# Patient Record
Sex: MALE | Race: WHITE | Employment: FULL TIME | URBAN - METROPOLITAN AREA
[De-identification: names, ages, dates, MRNs, and addresses within clinical notes are randomized per-mention and may not be internally consistent; named-entity substitution may affect disease eponyms.]

---

## 2020-08-23 ENCOUNTER — APPOINTMENT (OUTPATIENT)
Dept: GENERAL RADIOLOGY | Age: 45
End: 2020-08-23
Payer: COMMERCIAL

## 2020-08-23 ENCOUNTER — HOSPITAL ENCOUNTER (EMERGENCY)
Age: 45
Discharge: HOME | End: 2020-08-23
Attending: EMERGENCY MEDICINE
Payer: COMMERCIAL

## 2020-08-23 VITALS
SYSTOLIC BLOOD PRESSURE: 133 MMHG | HEART RATE: 69 BPM | HEIGHT: 72 IN | WEIGHT: 197 LBS | DIASTOLIC BLOOD PRESSURE: 95 MMHG | RESPIRATION RATE: 18 BRPM | BODY MASS INDEX: 26.68 KG/M2 | OXYGEN SATURATION: 99 % | TEMPERATURE: 98.1 F

## 2020-08-23 LAB
ABSOLUTE EOS #: 0.09 K/UL (ref 0–0.4)
ABSOLUTE IMMATURE GRANULOCYTE: ABNORMAL K/UL (ref 0–0.3)
ABSOLUTE LYMPH #: 2.41 K/UL (ref 1–4.8)
ABSOLUTE MONO #: 0.47 K/UL (ref 0.1–1.2)
ALBUMIN SERPL-MCNC: 5 G/DL (ref 3.5–5.2)
ALBUMIN/GLOBULIN RATIO: 1.8 (ref 1–2.5)
ALP BLD-CCNC: 75 U/L (ref 40–129)
ALT SERPL-CCNC: 29 U/L (ref 5–41)
ANION GAP SERPL CALCULATED.3IONS-SCNC: 12 MMOL/L (ref 9–17)
AST SERPL-CCNC: 21 U/L
BASOPHILS # BLD: 0 % (ref 0–2)
BASOPHILS ABSOLUTE: 0.02 K/UL (ref 0–0.2)
BILIRUB SERPL-MCNC: 0.98 MG/DL (ref 0.3–1.2)
BILIRUBIN DIRECT: 0.18 MG/DL
BILIRUBIN, INDIRECT: 0.8 MG/DL (ref 0–1)
BUN BLDV-MCNC: 12 MG/DL (ref 6–20)
BUN/CREAT BLD: ABNORMAL (ref 9–20)
CALCIUM SERPL-MCNC: 10.3 MG/DL (ref 8.6–10.4)
CHLORIDE BLD-SCNC: 98 MMOL/L (ref 98–107)
CO2: 26 MMOL/L (ref 20–31)
CREAT SERPL-MCNC: 0.93 MG/DL (ref 0.7–1.2)
D-DIMER QUANTITATIVE: 0.43 MG/L FEU
DIFFERENTIAL TYPE: ABNORMAL
EOSINOPHILS RELATIVE PERCENT: 1 % (ref 1–4)
GFR AFRICAN AMERICAN: >60 ML/MIN
GFR NON-AFRICAN AMERICAN: >60 ML/MIN
GFR SERPL CREATININE-BSD FRML MDRD: ABNORMAL ML/MIN/{1.73_M2}
GFR SERPL CREATININE-BSD FRML MDRD: ABNORMAL ML/MIN/{1.73_M2}
GLOBULIN: NORMAL G/DL (ref 1.5–3.8)
GLUCOSE BLD-MCNC: 114 MG/DL (ref 70–99)
HCT VFR BLD CALC: 44.7 % (ref 41–53)
HEMOGLOBIN: 15.7 G/DL (ref 13.5–17.5)
IMMATURE GRANULOCYTES: ABNORMAL %
LIPASE: 33 U/L (ref 13–60)
LYMPHOCYTES # BLD: 35 % (ref 24–44)
MCH RBC QN AUTO: 32.3 PG (ref 26–34)
MCHC RBC AUTO-ENTMCNC: 35.1 G/DL (ref 31–37)
MCV RBC AUTO: 92 FL (ref 80–100)
MONOCYTES # BLD: 7 % (ref 2–11)
NRBC AUTOMATED: ABNORMAL PER 100 WBC
PDW BLD-RTO: 12.1 % (ref 12.5–15.4)
PLATELET # BLD: 201 K/UL (ref 140–450)
PLATELET ESTIMATE: ABNORMAL
PMV BLD AUTO: 9.7 FL (ref 8–14)
POTASSIUM SERPL-SCNC: 4.9 MMOL/L (ref 3.7–5.3)
RBC # BLD: 4.86 M/UL (ref 4.5–5.9)
RBC # BLD: ABNORMAL 10*6/UL
SEG NEUTROPHILS: 57 % (ref 36–66)
SEGMENTED NEUTROPHILS ABSOLUTE COUNT: 4 K/UL (ref 1.8–7.7)
SODIUM BLD-SCNC: 136 MMOL/L (ref 135–144)
TOTAL PROTEIN: 7.8 G/DL (ref 6.4–8.3)
TROPONIN INTERP: NORMAL
TROPONIN INTERP: NORMAL
TROPONIN T: NORMAL NG/ML
TROPONIN T: NORMAL NG/ML
TROPONIN, HIGH SENSITIVITY: <6 NG/L (ref 0–22)
TROPONIN, HIGH SENSITIVITY: <6 NG/L (ref 0–22)
WBC # BLD: 7 K/UL (ref 3.5–11)
WBC # BLD: ABNORMAL 10*3/UL

## 2020-08-23 PROCEDURE — 84484 ASSAY OF TROPONIN QUANT: CPT

## 2020-08-23 PROCEDURE — 93005 ELECTROCARDIOGRAM TRACING: CPT | Performed by: EMERGENCY MEDICINE

## 2020-08-23 PROCEDURE — 85025 COMPLETE CBC W/AUTO DIFF WBC: CPT

## 2020-08-23 PROCEDURE — 80048 BASIC METABOLIC PNL TOTAL CA: CPT

## 2020-08-23 PROCEDURE — 36415 COLL VENOUS BLD VENIPUNCTURE: CPT

## 2020-08-23 PROCEDURE — 6370000000 HC RX 637 (ALT 250 FOR IP): Performed by: EMERGENCY MEDICINE

## 2020-08-23 PROCEDURE — 83690 ASSAY OF LIPASE: CPT

## 2020-08-23 PROCEDURE — 71045 X-RAY EXAM CHEST 1 VIEW: CPT

## 2020-08-23 PROCEDURE — 85379 FIBRIN DEGRADATION QUANT: CPT

## 2020-08-23 PROCEDURE — 80076 HEPATIC FUNCTION PANEL: CPT

## 2020-08-23 PROCEDURE — 99285 EMERGENCY DEPT VISIT HI MDM: CPT

## 2020-08-23 RX ORDER — MAGNESIUM HYDROXIDE/ALUMINUM HYDROXICE/SIMETHICONE 120; 1200; 1200 MG/30ML; MG/30ML; MG/30ML
30 SUSPENSION ORAL
Status: COMPLETED | OUTPATIENT
Start: 2020-08-23 | End: 2020-08-23

## 2020-08-23 RX ADMIN — ALUMINUM HYDROXIDE, MAGNESIUM HYDROXIDE, AND SIMETHICONE 30 ML: 200; 200; 20 SUSPENSION ORAL at 12:08

## 2020-08-23 ASSESSMENT — PAIN SCALES - GENERAL: PAINLEVEL_OUTOF10: 4

## 2020-08-23 ASSESSMENT — PAIN DESCRIPTION - LOCATION: LOCATION: CHEST

## 2020-08-23 ASSESSMENT — PAIN DESCRIPTION - PAIN TYPE: TYPE: ACUTE PAIN

## 2020-08-23 ASSESSMENT — PAIN DESCRIPTION - DESCRIPTORS: DESCRIPTORS: TIGHTNESS

## 2020-08-23 NOTE — ED PROVIDER NOTES
oral temperature is 98.1 °F (36.7 °C). His blood pressure is 129/86 and his pulse is 63. His respiration is 17 and oxygen saturation is 98%. Physical Exam  Vitals signs and nursing note reviewed. Constitutional:       Appearance: Normal appearance. HENT:      Head: Normocephalic and atraumatic. Eyes:      Conjunctiva/sclera: Conjunctivae normal.   Neck:      Musculoskeletal: Normal range of motion and neck supple. No neck rigidity or muscular tenderness. Cardiovascular:      Rate and Rhythm: Normal rate and regular rhythm. Pulses: Normal pulses. Heart sounds: Normal heart sounds. Pulmonary:      Effort: Pulmonary effort is normal. No respiratory distress. Breath sounds: Normal breath sounds. No stridor. No wheezing, rhonchi or rales. Abdominal:      General: Bowel sounds are normal. There is no distension. Palpations: Abdomen is soft. Tenderness: There is no abdominal tenderness. There is no guarding. Musculoskeletal: Normal range of motion. General: No swelling or tenderness. Lymphadenopathy:      Cervical: No cervical adenopathy. Skin:     General: Skin is warm and dry. Findings: No rash. Neurological:      General: No focal deficit present. Mental Status: He is alert. DIFFERENTIAL DIAGNOSIS/ MDM:     I will get a chest x-ray EKG and labs    DIAGNOSTIC RESULTS     EKG: All EKG's are interpreted by the Emergency Department Physician who either signs or Co-signs this chart in the [address] a cardiologist.      Interpreted by Jorge A Franks MD     Rhythm: normal sinus   Rate: 60  Axis: 52  Ectopy: none  Conduction: normal  ST Segments: no acute change  T Waves: no acute change  Q Waves: none    Clinical Impression: normal sinus rhythm with no acute changes/normal EKG. No acute infarction/ischemia noted.       Interpreted by Jorge A Franks MD     Rhythm: normal sinus   Rate: 64  Axis: 40  Ectopy: none  Conduction: normal  ST Segments: no acute change  T Waves: no acute change  Q Waves: none    Clinical Impression: normal sinus rhythm with no acute changes/normal EKG. No acute infarction/ischemia noted. RADIOLOGY:  Non-plain film images such as CT, Ultrasound and MRI are read by the radiologist. Stefanie Campos radiographic images are visualized and the radiologist interpretations are reviewed as follows:         Interpretation per the Radiologist below, if available at the time of this note:    XR CHEST PORTABLE (Final result)   Result time 08/23/20 12:18:49   Final result by Pramod Harrison MD (08/23/20 12:18:49)                 Impression:     No acute cardiopulmonary process. Narrative:     EXAMINATION:   ONE XRAY VIEW OF THE CHEST     8/23/2020 12:13 PM     COMPARISON:   None. HISTORY:   ORDERING SYSTEM PROVIDED HISTORY: chest pain   TECHNOLOGIST PROVIDED HISTORY:   chest pain   Reason for Exam: Mid sternal chest pain/tightness for 2 days.  Cough with   deep inspiration   Acuity: Unknown   Type of Exam: Unknown   Additional signs and symptoms: Pt unable to remove nipple piercings     FINDINGS:   AP portable view of the chest time stamped at 1210 hours demonstrates   overlying cardiac monitoring electrodes.  Heart size is normal.  No vascular   congestion, focal consolidation, effusion, or pneumothorax is noted.  Osseous   and mediastinal structures are age-appropriate.                    LABS:  Results for orders placed or performed during the hospital encounter of 68/48/79   Basic Metabolic Panel   Result Value Ref Range    Glucose 114 (H) 70 - 99 mg/dL    BUN 12 6 - 20 mg/dL    CREATININE 0.93 0.70 - 1.20 mg/dL    Bun/Cre Ratio NOT REPORTED 9 - 20    Calcium 10.3 8.6 - 10.4 mg/dL    Sodium 136 135 - 144 mmol/L    Potassium 4.9 3.7 - 5.3 mmol/L    Chloride 98 98 - 107 mmol/L    CO2 26 20 - 31 mmol/L    Anion Gap 12 9 - 17 mmol/L    GFR Non-African American >60 >60 mL/min    GFR African American >60 >60 mL/min    GFR Comment          GFR Staging NOT REPORTED    CBC Auto Differential   Result Value Ref Range    WBC 7.0 3.5 - 11.0 k/uL    RBC 4.86 4.5 - 5.9 m/uL    Hemoglobin 15.7 13.5 - 17.5 g/dL    Hematocrit 44.7 41 - 53 %    MCV 92.0 80 - 100 fL    MCH 32.3 26 - 34 pg    MCHC 35.1 31 - 37 g/dL    RDW 12.1 (L) 12.5 - 15.4 %    Platelets 673 233 - 486 k/uL    MPV 9.7 8.0 - 14.0 fL    NRBC Automated NOT REPORTED per 100 WBC    Differential Type NOT REPORTED     Immature Granulocytes NOT REPORTED 0 %    Absolute Immature Granulocyte NOT REPORTED 0.00 - 0.30 k/uL    WBC Morphology NOT REPORTED     RBC Morphology NOT REPORTED     Platelet Estimate NOT REPORTED     Seg Neutrophils 57 36 - 66 %    Lymphocytes 35 24 - 44 %    Monocytes 7 2 - 11 %    Eosinophils % 1 1 - 4 %    Basophils 0 0 - 2 %    Segs Absolute 4.00 1.8 - 7.7 k/uL    Absolute Lymph # 2.41 1.0 - 4.8 k/uL    Absolute Mono # 0.47 0.1 - 1.2 k/uL    Absolute Eos # 0.09 0.0 - 0.4 k/uL    Basophils Absolute 0.02 0.0 - 0.2 k/uL   Troponin   Result Value Ref Range    Troponin, High Sensitivity <6 0 - 22 ng/L    Troponin T NOT REPORTED <0.03 ng/mL    Troponin Interp NOT REPORTED    Lipase   Result Value Ref Range    Lipase 33 13 - 60 U/L   D-Dimer, Quantitative   Result Value Ref Range    D-Dimer, Quant 0.43 mg/L FEU   Hepatic Function Panel   Result Value Ref Range    Alb 5.0 3.5 - 5.2 g/dL    Alkaline Phosphatase 75 40 - 129 U/L    ALT 29 5 - 41 U/L    AST 21 <40 U/L    Total Bilirubin 0.98 0.3 - 1.2 mg/dL    Bilirubin, Direct 0.18 <0.31 mg/dL    Bilirubin, Indirect 0.80 0.00 - 1.00 mg/dL    Total Protein 7.8 6.4 - 8.3 g/dL    Globulin NOT REPORTED 1.5 - 3.8 g/dL    Albumin/Globulin Ratio 1.8 1.0 - 2.5   Troponin   Result Value Ref Range    Troponin, High Sensitivity <6 0 - 22 ng/L    Troponin T NOT REPORTED <0.03 ng/mL    Troponin Interp NOT REPORTED    EKG 12 Lead   Result Value Ref Range    Ventricular Rate 64 BPM    Atrial Rate 64 BPM    P-R Interval 152 ms    QRS Duration 88 ms    Q-T Interval 398 ms    QTc Calculation (Bazett) 410 ms    P Axis 38 degrees    R Axis 40 degrees    T Axis 39 degrees           EMERGENCY DEPARTMENT COURSE:   Vitals:    Vitals:    08/23/20 1156 08/23/20 1200 08/23/20 1230 08/23/20 1419   BP: (!) 119/93 120/83 124/86 129/86   Pulse: 67 62 62 63   Resp: 16 14 13 17   Temp: 98.1 °F (36.7 °C)      TempSrc: Oral      SpO2: 98% 97% 97% 98%   Weight: 89.4 kg (197 lb)      Height: 6' (1.829 m)        -------------------------  BP: 129/86, Temp: 98.1 °F (36.7 °C), Pulse: 63, Resp: 17      RE-EVALUATION:  The patient's work-up is unremarkable both sets of cardiac enzymes are within normal limits I did offer observation admission which the patient refuses he has had this pain for 2 days he is got 2 sets of negative cardiac enzymes he is not tachycardic he has not hypoxic states he will follow-up with his family doctor calling tomorrow at this time I am recommending that the patient return to the ER for increasing pain shortness of breath abdominal pain fever vomiting or other concerns otherwise to call his family doctor in the morning for a follow-up appointment    At this time the patient is without objective evidence of an acute process requiring hospitalization or inpatient management. They have remained hemodynamically stable throughout their entire ED visit and are stable for discharge with outpatient follow-up. The patient understands that at this time there is no evidence for a more malignant underlying process, but the patient also understands that early in the process of an illness or injury, an emergency department workup can be falsely reassuring. Routine discharge counseling was given, and the patient understands that worsening, changing or persistent symptoms should prompt an immediate call or follow up with their primary physician or return to the emergency department. The importance of appropriate follow up was also discussed.   I have reviewed the disposition diagnosis with the patient and or their family/guardian. I have answered their questions and given discharge instructions. They voiced understanding of these instructions and did not have any further questions or complaints. PROCEDURES:  None    FINAL IMPRESSION      1. Chest pain, unspecified type          DISPOSITION/PLAN   DISPOSITION        CONDITION ON DISPOSITION:   Stable    PATIENT REFERRED TO:  Yovani Treadwell MD  [address]  [phone]    Call in 1 day        DISCHARGE MEDICATIONS:  New Prescriptions    No medications on file       (Please note that portions of this note were completed with a voicerecognition program.  Efforts were made to edit the dictations but occasionally words are mis-transcribed.)    Cross MD, F.A.C.E.P.   Attending Emergency Medicine Physician       Jose Jackson MD  08/23/20 0220

## 2020-08-24 LAB
EKG ATRIAL RATE: 60 BPM
EKG ATRIAL RATE: 64 BPM
EKG P AXIS: 38 DEGREES
EKG P AXIS: 41 DEGREES
EKG P-R INTERVAL: 134 MS
EKG P-R INTERVAL: 152 MS
EKG Q-T INTERVAL: 388 MS
EKG Q-T INTERVAL: 398 MS
EKG QRS DURATION: 86 MS
EKG QRS DURATION: 88 MS
EKG QTC CALCULATION (BAZETT): 388 MS
EKG QTC CALCULATION (BAZETT): 410 MS
EKG R AXIS: 40 DEGREES
EKG R AXIS: 52 DEGREES
EKG T AXIS: 39 DEGREES
EKG T AXIS: 52 DEGREES
EKG VENTRICULAR RATE: 60 BPM
EKG VENTRICULAR RATE: 64 BPM

## 2020-10-26 ENCOUNTER — HOSPITAL ENCOUNTER (OUTPATIENT)
Dept: PHYSICAL THERAPY | Facility: CLINIC | Age: 45
Setting detail: THERAPIES SERIES
Discharge: HOME | End: 2020-10-26
Payer: COMMERCIAL

## 2020-10-26 PROCEDURE — 97161 PT EVAL LOW COMPLEX 20 MIN: CPT

## 2020-10-26 NOTE — CONSULTS
[] [facility]) - [facility] &  Therapy  [address]  [phone]  F: [phone] [] [address]  [place] [address]   [address]  P: [phone]  F: [phone] [x] [address]  Therapy  [address] [facility]  P: [phone]  F: [phone] [] [address]  [facility]   [address]   [place]: [phone]  F: [phone]      Physical Therapy Evaluation    Date:  10/26/2020  Patient: Radha Carrillo  : 1975  MRN: [id number]  Adilson Hyman MD    Insurance: [facility]  Medical Diagnosis: M25.511 (ICD-10-CM) - Pain in right shoulder     Rehab Codes: M25.511, M25.561  Onset Date: 3/1/20   Next 's appt: 10/27/20    Subjective:   CC: Patient reports he is experiencing significant R knee pain and was supposed to receive PT for both    R Shoulder pain:   Constant R shoulder pain reported as dull aching at rest and worse with movement, reaching, L  side lying, and is inable  to do pushups. He can experience intermitting sharp shooting pain and intermittent numbness in finger tips  Pain location is anterior shoulder toward posterior shoulder. Pain is reported as 1-3/10 at rest and 5-6 with ocasional 34/99 with certain activities. R Knee pain:    Patient reports experiencing intermittent Clicking, popping and catching  with continuous pain posterior,lateral and medial knee. Patient states that he is limited in sitting <2hrs and pain is made worse with  lunges, squats, stairs.  Pain described as dull aching, unable to find position of comfort often wears knee brace  Knee pain rated as 5-6/10 at worst at best 3/10 at best     HPI: (onset date) R shoulder 3/1/20 IZZY unknown reports SLAP tear 13yrs ago did not have surgery     R knee: IZZY 2015 stepped down into snow and hit ground hard     Work Activities: [employer]  Working out: cross fit type training 3 days per week    Symptoms:   [x] Worsening: increased intensity, increased frequency of pain    Sleep: [x] OK        PMHx: [] Unremarkable [] Diabetes [] HTN  [] Pacemaker   [] MI/Heart Problems [] Cancer [] Arthritis [x] Other:T10-L5 fusion              [x] Refer to full medical chart  In EPIC     Tests: [x] X-Ray: [x] MRI: scheduled      Medications: [x] Refer to full medical record [] None [] Other:  Allergies:      [x] Refer to full medical record [] None [] Other:    Function:  Hand Dominance  [] Right  [x] Left      OBSERVATION No Deficit Deficit Not Tested Comments   Forward Head [] [] []    Rounded Shoulders [] [] []    Kyphosis [] [] []    Scap Height/Position [] [x] [] Abducted and R elevated    Winging [] [x] []    SH Rhythm [] [x] [] Excessive motion   INSPECTION/PALPATION       SC/AC Joint [x] [] [] pec minor and major    Supraspinatus [x] [] []    Biceps tendon/groove [] [x] []    Posterior shld [] [x] [] infra spinatus   Subscapularis [] [x] []    NEUROLOGICAL       Cervical ROM/Quadrant [] [] [x]    Reflexes [] [] [x]    Compression/Distraction [] [] [x]    Sensation [] [] [x]      IR shoulders  Upper tap hypertrophy    Objective:     ROM  °A/P END FEEL STRENGTH TESTS (+/-) Left Right Not Tested    Left Right  Left Right RTC   []   Sit Shld Flex      Full Can   []   Sit Shld Abd      Painful Arc   []   Sit Shld IR      Drop Arm  - []   Shoulder Flex  125   4    []   Ext      LABRUM   []   ABD  95    Aiea's   []   ER @  90  55   4+ Speeds   []   IR  5   4+ Biceps Load   []   Supraspinatus     4 Pronated Biceps Load      Infraspinatus            Serratus Ant      IMPINGE      Pectoralis      Hooks  +    Rhomboids 4 3+    Neer  +    Mid Trap 4 3+          Pec minor 4 3    INSTABIL      Elbow Flex. Apprehension      Elbow Ext.       BICEPS            Speeds  -    Wrist flex            Wrist ext      Lift Off  +    Rad. Dev.      Tereza Pock Press  +    Ulnar Dev. PROM R shoulder  Flexion: wnl  Abduction:125 degrees    Glenohumeral Passive Joint Mobility  Anterior Capsule:pain  Posterior Capsule:hypo  Inferior Capsule: wfl    R KNEE   ROM wnl     DF R  Knee extended: -20 degrees  Knee flexed -15 degrees    DF L  Knee extended -2 degrees  Knee flexed 4 degrees    PPI:  medial HS, lateral gastroc, med and lat tibial plateau, Patellar tendon    Flexibility Normal Left tight Right tight   Hip flexor [] [x] [x]   quad [] [] []   HS [] [] []   piriformis [] [x] [x]   ITB [] [] []   gastroc [] [x] [x]   Soleus  [] [x] [x]   Levator scap [] [] [x]   Scalenes [] [] [x]      MMT:  G medius 3+ BRYON  G max -3 BRYON    Knee Special Tests:  Compression -,  Distraction+  Yaya +  Obers +  HOP -  SL stance: 30sec bryon, R foot supination bias, L pronation foot bias  SL squat unstable medial knee dive, trunk sway, knee over toes    Functional Test: UEFI Score: 63/80% functionally impaired     Comments:Patient presents to physical therapy c/o R shoulder and R knee pain that is becoming worse and interfering with activities. Patient demonstrates weak R scapular stabilization with elevation and moderate postural deficits into a kyphotic rounded shoulders and IR humerus. Patient also presents with weakness in RC strength. He demonstrates full R knee ROM but has hip and foot stabilization deficits and hypomobility    Assessment:  Patient would benefit from skilled physical therapy services in order to: reduce pain and improve functional mobility    Problems:    [x] ? Pain:  [x] ? ROM:  [x] ? Strength:  [x] ? Function:  [] Other:      STG: (to be met in 9 treatments)  1. ? Pain: Patient to report 0/10 R shoulder pain at rest 4/10 at worst, and 3/10 R knee pain at worst  2. ? ROM:PAtient to demonstrate 160 degrees R shoulder flexion and abduction, -10 degrees R knee DF knee extended  3. ? Strength:  4. ? Function: Patient to demonstrate  5.  Patient to be independent with home exercise program as demonstrated by performance with correct form without cues. 6. Demonstrate Knowledge of fall prevention  LTG: (to be met in 18 treatments)  1. Patient to report 1/10 R shoulder pain with UE elevation and 1/10 R knee pain with stairs and squatting  2. Patient to demonstrate full R shoulder ROM 0 degrees R knee DF  3. Patient to demonstrate 4/5 mid and lower trap and rhomboid mm  4. Patient to demonstrate 4/5 gluteal medius and max  5. Patient to demonstrate the ability to control knee positioning with SL squats                   Patient goals: To stop the pain    Rehab Potential:  [x] Good  [] Fair  [] Poor   Suggested Professional Referral:  [x] No  [] Yes:  Barriers to Goal Achievement:  [x] No  [] Yes:  Domestic Concerns:  [x] No  [] Yes:    Pt. Education:  [x] Plans/Goals, Risks/Benefits discussed  [] Home exercise program  Method of Education: [] Verbal  [] Demo  [] Written  Comprehension of Education:  [] Verbalizes understanding. [] Demonstrates understanding. [] Needs Review. [] Demonstrates/verbalizes understanding of HEP/Ed previously given.     Treatment Plan:  [x] Therapeutic Exercise   79842  [] Iontophoresis: 4 mg/mL Dexamethasone Sodium Phosphate  mAmin  69098   [] Therapeutic Activity  55965 [x] Vasopneumatic cold with compression  66769    [] Gait Training   27045 [] Ultrasound   25955   [x] Neuromuscular Re-education  05360 [] Electrical Stimulation Unattended  73077   [x] Manual Therapy  30727 [] Electrical Stimulation Attended  56123   [x] Instruction in HEP  [] Lumbar/Cervical Traction  23224   [] Aquatic Therapy   41076 [] Cold/hotpack    [] Massage   04269      [] Dry Needling, 1 or 2 muscles  36863   [] Biofeedback, first 15 minutes   97594  [] Biofeedback, additional 15 minutes   39839 [] Dry Needling, 3 or more muscles  08363     []  Medication allergies reviewed for use of    Dexamethasone Sodium Phosphate 4mg/ml     with iontophoresis treatments. Pt is not allergic. Frequency: 1-2 x/week for 18 visits      Modalities: vasocompression [id number] 34 degrees- PRN  Precautions:T10-L5 fusion   Exercises:  Exercise Reps/ Time Weight/ Level Comments   Pully's   VC's for scapular positioning   SB press      Wall slides   scaption only, 5th finger on wall   Ball on wall   s-s, cw VC's for scapular retraction   Supine rhythmic stabs   Elbow on towel   Mid back series   VC's to eliminate scapular tipping   Side lying ER      Prone lower trap on ball      Prone row w/ flexed elbow   VC's for scapular control   4 pt kneel w/ L UE slider   VC's for scapular retraction               Pt. Edu   posture   Other:Manual pec minor release     Specific Instructions for next treatment: See Above      Evaluation Complexity:  History (Personal factors, comorbidities) [x] 0 [] 1-2 [] 3+   Exam (limitations, restrictions) [x] 1-2 [] 3 [] 4+   Clinical presentation (progression) [x] Stable [] Evolving  [] Unstable   Decision Making [x] Low [] Moderate [] High    [x] Low Complexity [] Moderate Complexity [] High Complexity       Treatment Charges: Mins Units   [x] Evaluation       [x]  Low       []  Moderate       []  High 60 1   []  Modalities     []  Ther Exercise     []  Manual Therapy     []  Ther Activities     []  Aquatics     []  Vasocompression     []  Other       TOTAL TREATMENT TIME:     Time in: 2:30   Time Out: 3:10    Electronically signed by: Chinyere Escoto, PT    Physician Signature:________________________________Date:__________________  By signing above or cosigning this note, I have reviewed this plan of care and certify a need for medically necessary rehabilitation services.      *PLEASE SIGN ABOVE AND FAX BACK ALL PAGES*

## 2020-10-29 ENCOUNTER — HOSPITAL ENCOUNTER (OUTPATIENT)
Dept: PHYSICAL THERAPY | Facility: CLINIC | Age: 45
Setting detail: THERAPIES SERIES
Discharge: HOME | End: 2020-10-29
Payer: COMMERCIAL

## 2020-10-29 PROCEDURE — 97110 THERAPEUTIC EXERCISES: CPT

## 2020-10-29 NOTE — FLOWSHEET NOTE
[x] [address]    Outpatient Rehabilitation &  Therapy  [address] [facility]  P: [phone]  F: [phone]      Physical Therapy Daily Treatment Note    Date:  10/29/2020  Patient Name:  Linda Mendez    :  1975  MRN: [id number]  Gregorio Morton MD                       Insurance: [facility]  Medical Diagnosis: F84.094 (ICD-10-CM) - Pain in right shoulder                          Rehab Codes: M25.511, M25.561  Onset Date: 3/1/20                 Next 's appt: PRN awaiting MRI  Visit# / total visits:      Cancels/No Shows: 0    Subjective:    Pain:  [x] Yes  [] No Location: R shoulder   Pain Rating: (0-10 scale) 0-7/10 depending on movement  Pain altered Tx:  [x] No  [] Yes  Action:  Comments:Pt mentioned that his doctor is ordering a MRI for his shoulder. Objective:  Modalities: vasocompression [id number] 34 degrees- PRN  Precautions:T10-L5 fusion   Exercises:  Exercise Reps/ Time Weight/ Level Comments    Pully's 4m   Flexion/scaption x   SB press          Wall slides  20x   scaption only, 5th finger on wall x   Ball on wall  20x Soccer ball  s-s, cw/ccw, up-down  VC's for scapular retraction x          Prone       Prone Scap retraining       Retractions, depression 20x10s   x   Prone lower trap on ball 20x5s   x   Prone row w/ flexed elbow 20x5s   x                        Supine rhythmic stabs     Elbow on towel    Mid back series 20x Yellow  Extension, scaption, triceps x   Side lying ER 20x   Towel under elbow x              4 pt kneel w/ L UE slider     VC's for scapular retraction                          Pt. Edu     posture x   Other:Manual pec minor release      Specific Instructions for next treatment:   Continue with above.    New script was received to address R knee pain Add: manual gastroc STR, gastroc static and dynamic stretch, clam shells, modified pigeon in prone progressing to bilateral bridge as able      Treatment Charges: Mins Units   []  Modalities [x]  Ther Exercise 43 3   [x]  Manual Therapy 5 n/c   []  Ther Activities     []  Aquatics     []  Vasocompression     []  Other     Total Treatment time 48 3       Assessment: [x] Progressing toward goals. Began tx with pulleys to warm up the shoulders. Educated pt on wall slides to ensure shoulder retraction and that the pt had the proper direction into the scaption plane to work on ROM of the shoulder. Demonstrated to pt proper technique in using the ball on the wall to focus on stabilization of the scapula, and instructed pt to maintain retraction of the shoulder for proper technique. Started SB push downs, with the pt unable to perform the motion properly, they were stopped and the motion was broken down while in prone. Had pt perform shoulder retractions with a hold to work on getting the pt familiar with the feel of shoulder retractions. Then added scapular depression with the retraction to develop mid back strengthening to aide in scapular stability. Cued pt during prone row and lower trap exercises to relax the UT during the motion and not engage it while performing the \"squeeze\". Educated pt on the proper direction and technique for the mid back series exercises and cued throughout exercise to keep from flexing the elbow while working the shoulder flexion and scaption. Finished treatment with manual releases for Pec Major and Pec minor to loosen them. Pt denied need for any vasocompression and said he would ice at home if needed. [] No change. [x] Other:    STG: (to be met in 9 treatments)  1. ? Pain: Patient to report 0/10 R shoulder pain at rest 4/10 at worst, and 3/10 R knee pain at worst  2. ? ROM:PAtient to demonstrate 160 degrees R shoulder flexion and abduction, -10 degrees R knee DF knee extended  3. ? Strength:  4. ? Function: Patient to demonstrate  5. Patient to be independent with home exercise program as demonstrated by performance with correct form without cues.   6. Demonstrate Knowledge of fall prevention    LTG: (to be met in 18 treatments)  1. Patient to report 1/10 R shoulder pain with UE elevation and 1/10 R knee pain with stairs and squatting  2. Patient to demonstrate full R shoulder ROM 0 degrees R knee DF  3. Patient to demonstrate 4/5 mid and lower trap and rhomboid mm  4. Patient to demonstrate 4/5 gluteal medius and max  5. Patient to demonstrate the ability to control knee positioning with SL squats                    Patient goals: To stop the pain      Pt. Education:  [x] Yes  [] No  [x] Reviewed Prior HEP/Ed  Method of Education: [x] Verbal  [x] Demo  [] Written  Comprehension of Education:  [x] Verbalizes understanding. [x] Demonstrates understanding. [] Needs review. [] Demonstrates/verbalizes HEP/Ed previously given. Plan: [x] Continue with current plan of care towards goals.         Time In:3:04pm            Time Out: 3:56pm    Electronically signed by:  Srinivas Cuevas PTA

## 2020-11-04 ENCOUNTER — HOSPITAL ENCOUNTER (OUTPATIENT)
Dept: PHYSICAL THERAPY | Facility: CLINIC | Age: 45
Setting detail: THERAPIES SERIES
Discharge: HOME | End: 2020-11-04
Payer: COMMERCIAL

## 2020-11-04 PROCEDURE — 97110 THERAPEUTIC EXERCISES: CPT

## 2020-11-04 NOTE — FLOWSHEET NOTE
[x] [address]    Outpatient Rehabilitation &  Therapy  [address] [facility]  P: [phone]  F: [phone]      Physical Therapy Daily Treatment Note    Date:  2020  Patient Name:  Cyn Flores    :  1975  MRN: [id number]  Mike Clifton MD                       Insurance: [facility]  Medical Diagnosis: H30.583 (ICD-10-CM) - Pain in right shoulder                          Rehab Codes: M25.511, M25.561  Onset Date: 3/1/20                 Next 's appt: PRN awaiting MRI  Visit# / total visits: 3/18     Cancels/No Shows: 0    Subjective:    Pain:  [x] Yes  [] No Location: R shoulder   Pain Rating: (0-10 scale) 0-7/10 depending on movement  Pain altered Tx:  [x] No  [] Yes  Action:  Comments:Patient reports MRI scheduled for     Objective:  Modalities: vasocompression [id number] 34 degrees- PRN  Precautions:T10-L5 fusion   Exercises:  Exercise Reps/ Time Weight/ Level Comments    Pully's 5m   scaption VC's for scapular control x   SB press          Wall slides  20x   scaption only, 5th finger on wall x   Ball on wall  20x  s-s, cw   VC's for scapular retraction x          Prone       Prone Scap retraining    x   Retractions, depression 20x10s  HO Given x   Prone lower trap on ball 20x5s  RESUME    Prone row w/ flexed elbow 20x5s  RESUME           Supine serratus press x20 1.5 Manual guidance to maintain ER alignment x   Supine alphabet x2  Lower and upper case x   Supine rhythmic stabs     Elbow on towel--RESUME    Mid back series 20x Yellow  Extension, scaption, triceps x   Side lying ER 20x   Towel under elbow x              4 pt kneel w/ L UE slider     VC's for scapular retraction--RESUME    4 pt kneel serratus press      RESUME           R KNEE       Static gastroc stretch 3x60\"  HO Given x   Dynamic gastroc stretch 3x60  HO Given x   Clam shells 30/90 x20ea  HO Given x   Hip lift x20  Ball btw knees VC's for gluteal strength and to eliminate rolling x    modified pigeon  x20     VC's for sequencing and to maintain gluteal squeeze x   Pt. Edu     posture x   Other:      Specific Instructions for next treatment:   Continue with above and progress CKC scapular stabilization, review HEP given for hips      Treatment Charges: Mins Units   []  Modalities     [x]  Ther Exercise 55 4   []  Manual Therapy     []  Ther Activities     []  Aquatics     []  Vasocompression     []  Other     Total Treatment time 55 4       Assessment: [x] Progressing toward goals. [] No change. [x] Other: Received a new script to begin therapy to treat R knee pain. Exercises added and education given on the importance of hip stabilization and gastroc length. Tactile and VC's needed to correct alignment and improve exercise technique. Difficulty maintaining pelvic stabilization while completing LE movements. Muscles fatigued quickly. Multi tactile and VC's needed to reduce scapular winging and shrugging. STG: (to be met in 9 treatments)  1. ? Pain: Patient to report 0/10 R shoulder pain at rest 4/10 at worst, and 3/10 R knee pain at worst  2. ? ROM:PAtient to demonstrate 160 degrees R shoulder flexion and abduction, -10 degrees R knee DF knee extended  3. ? Strength:  4. ? Function: Patient to demonstrate  5. Patient to be independent with home exercise program as demonstrated by performance with correct form without cues. 6. Demonstrate Knowledge of fall prevention    LTG: (to be met in 18 treatments)  1. Patient to report 1/10 R shoulder pain with UE elevation and 1/10 R knee pain with stairs and squatting  2. Patient to demonstrate full R shoulder ROM 0 degrees R knee DF  3. Patient to demonstrate 4/5 mid and lower trap and rhomboid mm  4. Patient to demonstrate 4/5 gluteal medius and max  5. Patient to demonstrate the ability to control knee positioning with SL squats                    Patient goals: To stop the pain      Pt.  Education:  [x] Yes  [] No  [x] Reviewed Prior HEP/Ed  Method

## 2020-11-06 ENCOUNTER — HOSPITAL ENCOUNTER (OUTPATIENT)
Dept: PHYSICAL THERAPY | Facility: CLINIC | Age: 45
Setting detail: THERAPIES SERIES
Discharge: HOME | End: 2020-11-06
Payer: COMMERCIAL

## 2020-11-06 PROCEDURE — 97110 THERAPEUTIC EXERCISES: CPT

## 2020-11-06 NOTE — FLOWSHEET NOTE
[x] [address]    Outpatient Rehabilitation &  Therapy  [address]  P: [phone]  F: [phone]      Physical Therapy Daily Treatment Note    Date:  2020  Patient Name:  Sadiq Daniel    :  1975  MRN: [id number]  Eugenio Payton MD                       Insurance: [facility]  Medical Diagnosis: H58.507 (ICD-10-CM) - Pain in right shoulder                          Rehab Codes: M25.511, M25.561  Onset Date: 3/1/20                 Next 's appt: PRN awaiting MRI  Visit# / total visits:      Cancels/No Shows: 0    Subjective:    Pain:  [x] Yes  [] No Location: R shoulder   Pain Rating: (0-10 scale) 0-7/10 depending on movement  Pain altered Tx:  [x] No  [] Yes  Action:  Comments:Pt mentioned that he did a couple hundred push ups the other day. Stated when his shoulder bothered him he just adjusted his arm position and kept going. Objective:  Modalities: vasocompression [id number] 34 degrees- PRN  Precautions:T10-L5 fusion   Exercises:  Exercise Reps/ Time Weight/ Level Comments    Pully's 5m Flexion/scaption scaption VC's for scapular control xx           Wall slides  20x   scaption only, 5th finger on wall xx   Ball on wall  20x 1.5# s-s, cw   VC's for scapular retraction xx          Prone       Prone lower trap on ball 20x5s  Soccer ball  xx   Prone row w/ flexed elbow 20x5s 5#   xx          Supine serratus press x20 2# Manual guidance to maintain ER alignment xx   Supine rhythmic stabs 3x30s     xx   Mid back series 20x Yellow  Extension, scaption, triceps    Side lying ER 20x 2# Towel under elbow xx              4 pt kneel w/ R UE towel    VC's for scapular retraction--  xx   4 pt kneel serratus press 20x     xx          R KNEE       Hip lift x20  Ball btw knees VC's for gluteal strength and to eliminate rolling xx    modified pigeon  x20     VC's for sequencing and to maintain gluteal squeeze xx   Pt.  Edu     posture xx   Other:      Specific Instructions for next treatment:   Continue with above and progress CKC scapular stabilization, review HEP given for hips      Treatment Charges: Mins Units   []  Modalities     [x]  Ther Exercise 55 4   [x]  Manual Therapy 5 0   []  Ther Activities     []  Aquatics     []  Vasocompression     []  Other     Total Treatment time 60 4       Assessment: [x] Progressing toward goals. [] No change. [x] Other: Hands on tactile cueing with most exercises to help ensure proper movement of scapular rhythm. Pt able to perform exercise with good form after corrections. Palpated a larger size trigger point in pt upper trap. Utilized the hypervolt at the end of the treatment to aide in trigger point release. Educated pt on proper posture and scapular stability exercises at home. STG: (to be met in 9 treatments)  1. ? Pain: Patient to report 0/10 R shoulder pain at rest 4/10 at worst, and 3/10 R knee pain at worst  2. ? ROM:PAtient to demonstrate 160 degrees R shoulder flexion and abduction, -10 degrees R knee DF knee extended  3. ? Strength:  4. ? Function: Patient to demonstrate  5. Patient to be independent with home exercise program as demonstrated by performance with correct form without cues. 6. Demonstrate Knowledge of fall prevention    LTG: (to be met in 18 treatments)  1. Patient to report 1/10 R shoulder pain with UE elevation and 1/10 R knee pain with stairs and squatting  2. Patient to demonstrate full R shoulder ROM 0 degrees R knee DF  3. Patient to demonstrate 4/5 mid and lower trap and rhomboid mm  4. Patient to demonstrate 4/5 gluteal medius and max  5. Patient to demonstrate the ability to control knee positioning with SL squats                    Patient goals: To stop the pain      Pt. Education:  [x] Yes  [] No  [x] Reviewed Prior HEP/Ed  Method of Education: [x] Verbal  [x] Demo  [] Written  Comprehension of Education:  [x] Verbalizes understanding. [x] Demonstrates understanding. [] Needs review.   [] Demonstrates/verbalizes HEP/Ed previously given. Plan: [x] Continue with current plan of care towards goals.         Time In:1:55pm            Time Out: 3:00pm    Electronically signed by:  Yuli Stephen PTA

## 2020-11-10 ENCOUNTER — HOSPITAL ENCOUNTER (OUTPATIENT)
Dept: PHYSICAL THERAPY | Facility: CLINIC | Age: 45
Setting detail: THERAPIES SERIES
Discharge: HOME | End: 2020-11-10
Payer: COMMERCIAL

## 2020-11-10 PROCEDURE — 97110 THERAPEUTIC EXERCISES: CPT

## 2020-11-10 NOTE — FLOWSHEET NOTE
[x] [address]    Outpatient Rehabilitation &  Therapy  [address] [facility]  P: [phone]  F: [phone]      Physical Therapy Daily Treatment Note    Date:  11/10/2020  Patient Name:  Reggie Pacheco    :  1975  MRN: [id number]  Vel Moreau MD                       Insurance: [facility]  Medical Diagnosis: A98.867 (ICD-10-CM) - Pain in right shoulder                          Rehab Codes: M25.511, M25.561  Onset Date: 3/1/20                 Next 's appt: PRN awaiting MRI  Visit# / total visits:      Cancels/No Shows: 0    Subjective:    Pain:  [x] Yes  [] No Location: R shoulder   Pain Rating: (0-10 scale) 0-7/10 depending on movement  Pain altered Tx:  [x] No  [] Yes  Action:  Comments:Pt says he is feeling pretty good today. Mentioned a little bit of pain in the right shoulder but otherwise he's feeling fine. Objective:  Modalities: vasocompression [id number] 34 degrees- PRN  Precautions:T10-L5 fusion   Exercises:  Exercise Reps/ Time Weight/ Level Comments    Pully's 5m Flexion/scaption scaption VC's for scapular control x           Wall slides  20x   scaption only, 5th finger on wall x   Ball on wall  20x 1.5# s-s, cw   VC's for scapular retraction x          Prone       Prone lower trap on ball 20x5s  Soccer ball  x   Prone row w/ flexed elbow 20x5s 5#   x          Supine serratus press x20 2#  x   Supine rhythmic stabs 3x30s     x   Mid back series 20x blue Extension, scaption, triceps    Side lying ER 20x 2# Towel under elbow x              4 pt kneel w/ R UE towel    VC's for scapular retraction--  x   4 pt kneel serratus press 20x     x          Tband 20x blue Extension, triceps, rows x          R KNEE       clamshells x20 blue  x   SL hip abduction x20 blue  x   Bridges x20 blue  x   modified pigeon x20    VC's for sequencing and to maintain gluteal squeeze x   Pt.  Edu     posture    Other:      Specific Instructions for next treatment:   Continue with above and shoulder ROM 0 degrees R knee DF  3. Patient to demonstrate 4/5 mid and lower trap and rhomboid mm  4. Patient to demonstrate 4/5 gluteal medius and max  5. Patient to demonstrate the ability to control knee positioning with SL squats                    Patient goals: To stop the pain      Pt. Education:  [x] Yes  [] No  [x] Reviewed Prior HEP/Ed  Method of Education: [x] Verbal  [x] Demo  [] Written  Comprehension of Education:  [x] Verbalizes understanding. [x] Demonstrates understanding. [] Needs review. [] Demonstrates/verbalizes HEP/Ed previously given. Plan: [x] Continue with current plan of care towards goals. Time In: 2:00pm            Time Out: 3:01pm    Electronically signed by:  Manjit Ji The above documentation completed by John Romero , Student Physical Therapist Assistant, was reviewed and accepted by supervising Clinical Instructor Kathya Barajas PTA.

## 2020-11-13 ENCOUNTER — HOSPITAL ENCOUNTER (OUTPATIENT)
Dept: PHYSICAL THERAPY | Facility: CLINIC | Age: 45
Setting detail: THERAPIES SERIES
Discharge: HOME | End: 2020-11-13
Payer: COMMERCIAL

## 2020-11-13 PROCEDURE — 97140 MANUAL THERAPY 1/> REGIONS: CPT

## 2020-11-13 PROCEDURE — 97110 THERAPEUTIC EXERCISES: CPT

## 2020-11-13 PROCEDURE — 97016 VASOPNEUMATIC DEVICE THERAPY: CPT

## 2020-11-13 NOTE — FLOWSHEET NOTE
[x] [address]    Outpatient Rehabilitation &  Therapy  [address] [facility]  P: [phone]  F: [phone]      Physical Therapy Daily Treatment Note    Date:  2020  Patient Name:  Cyn Flores    :  1975  MRN: [id number]  Mike Clifton MD                       Insurance: [facility]  Medical Diagnosis: K31.808 (ICD-10-CM) - Pain in right shoulder                          Rehab Codes: M25.511, M25.561  Onset Date: 3/1/20                 Next 's appt: PRN awaiting MRI  Visit# / total visits:      Cancels/No Shows: 0    Subjective:    Pain:  [x] Yes  [] No Location: R shoulder   Pain Rating: (0-10 scale) 0-7/10 depending on movement  Pain altered Tx:  [x] No  [] Yes  Action:  Comments:Patient reports feeling good today.  No c/o R knee pain he feels pain primarily with squatting and lunges minimal R shoulder pain    Objective:  Modalities: vasocompression [id number] 34 degrees- PRN  Manual: STR R gastroc with and without hypervolt x8mins  Precautions:T10-L5 fusion   Exercises:  Exercise Reps/ Time Weight/ Level Comments    Pully's 5m scaption only scaption VC's for scapular control x           Wall slides  20x   scaption only, 5th finger on wall x   Ball on wall  20x 1.5# s-s, cw   VC's for scapular retraction x          Prone       Prone lower trap on ball 20x10\"  Soccer ball  x   Prone row w/ flexed elbow 15x5s 5#   x          Supine serratus press x20 2#     Supine rhythmic stabs 3x30s        Mid back series 20x blue Extension, scaption, triceps    Side lying ER 20x 2# Towel under elbow               4 pt kneel w/ R UE  2x10   VC's for scapular retraction--  x   4 pt kneel serratus press 20x     x          Tband 20x blue Extension, triceps, rows           R KNEE       Static gastroc stretch 3x60\"   x   Dynamic gastroc stretch 3x20   x   clamshells x20 blue  x   Zahra hip abd x15   x   Bridges 20x5\" blue  x   modified pigeon x20    VC's for sequencing and to maintain gluteal squeeze    Pt. Edu     Squat mechanics x   Other:       Specific Instructions for next treatment:   Continue with above and progress CKC scapular stabilization, review HEP given for hips    Treatment Charges: Mins Units   []  Modalities     [x]  Ther Exercise 35 2   [x]  Manual Therapy 10 1   []  Ther Activities     []  Aquatics     [x]  Vasocompression 15 1   []  Other     Total Treatment time 60 4       Assessment: [x] Progressing toward goals. Patient reports noncompliance with HEP for R knee rehab. Therapist discussed with patient the importance of hip strengthening and hip and knee ROM to reduce stress to R knee. Reviewed HEP and patient education given on squat mechanics and lunges with proper knee alignment. Multi VC's needed with R shoulder exercises to reduce upper trap hyperactivity and to control scapular stabilization. [] No change. STG: (to be met in 9 treatments)  1. ? Pain: Patient to report 0/10 R shoulder pain at rest 4/10 at worst, and 3/10 R knee pain at worst  2. ? ROM:PAtient to demonstrate 160 degrees R shoulder flexion and abduction, -10 degrees R knee DF knee extended  3. ? Strength:  4. ? Function: Patient to demonstrate  5. Patient to be independent with home exercise program as demonstrated by performance with correct form without cues. 6. Demonstrate Knowledge of fall prevention    LTG: (to be met in 18 treatments)  1. Patient to report 1/10 R shoulder pain with UE elevation and 1/10 R knee pain with stairs and squatting  2. Patient to demonstrate full R shoulder ROM 0 degrees R knee DF  3. Patient to demonstrate 4/5 mid and lower trap and rhomboid mm  4. Patient to demonstrate 4/5 gluteal medius and max  5. Patient to demonstrate the ability to control knee positioning with SL squats                    Patient goals: To stop the pain      Pt.  Education:  [x] Yes  [] No  [x] Reviewed Prior HEP/Ed  Method of Education: [x] Verbal  [x] Demo  [] Written  Comprehension of Education:  [x] Verbalizes understanding. [x] Demonstrates understanding. [] Needs review. [] Demonstrates/verbalizes HEP/Ed previously given. Plan: [x] Continue with current plan of care towards goals.         Time In: 8am            Time Out: 9am  Electronically signed by:  Iván Pinto PT

## 2020-11-17 ENCOUNTER — HOSPITAL ENCOUNTER (OUTPATIENT)
Dept: PHYSICAL THERAPY | Facility: CLINIC | Age: 45
Setting detail: THERAPIES SERIES
Discharge: HOME | End: 2020-11-17
Payer: COMMERCIAL

## 2020-11-17 ENCOUNTER — APPOINTMENT (OUTPATIENT)
Dept: PHYSICAL THERAPY | Facility: CLINIC | Age: 45
End: 2020-11-17
Payer: COMMERCIAL

## 2020-11-17 PROCEDURE — 97016 VASOPNEUMATIC DEVICE THERAPY: CPT

## 2020-11-17 PROCEDURE — 97110 THERAPEUTIC EXERCISES: CPT

## 2020-11-17 NOTE — FLOWSHEET NOTE
[x] [address]   Outpatient Rehabilitation &  Therapy  [address]  P: [phone]  F: [phone]      Physical Therapy Daily Treatment Note    Date:  2020  Patient Name:  Ophelia Sherman    :  1975  MRN: [id number]  James Sebastian MD                       Insurance: [facility]  Medical Diagnosis: U72.492 (ICD-10-CM) - Pain in right shoulder                          Rehab Codes: M25.511, M25.561  Onset Date: 3/1/20                 Next 's appt: PRN awaiting MRI  Visit# / total visits:      Cancels/No Shows: 0    Subjective:    Pain:  [] Yes  [x] No Location: R shoulder   Pain Rating: (0-10 scale) 0/10   Pain altered Tx:  [x] No  [] Yes  Action:  Comments:Patient arrived reporting no pain or issues in shoulder or knee. Pt stated he was a little sore after last visit but went away after a couple of hours.      Objective:  Modalities: vasocompression [id number] 34 degrees- PRN  Manual: STR R gastroc with and without hypervolt x5mins  Precautions:T10-L5 fusion   Exercises:  Exercise Reps/ Time Weight/ Level Comments    Pully's 5m scaption only scaption VC's for scapular control x           Wall slides  20x   scaption only, 5th finger on wall x   Ball on wall  20x 1.5# s-s, cw   VC's for scapular retraction   90 and 120 degree flexion, 90 degree abduction x          Prone       Prone lower trap on ball 20x10\"  Soccer ball  x   Prone row w/ flexed elbow 15x5s 5#   x          Supine serratus press x20 2#     Supine rhythmic stabs 3x30s        Mid back series 20x blue Extension, scaption, triceps    Side lying ER 20x 2# Towel under elbow               4 pt kneel w/ R UE  2x10   VC's for scapular retraction--  x   4 pt kneel serratus press 20x     x          Tband 20x blue Extension, triceps, rows           R KNEE       Static gastroc stretch 3x60\"   x   Dynamic gastroc stretch 3x20   x   clamshells 2x15 blue  x   Zahra hip abd 2x15   x   Bridges 20x5\" blue  x   modified pigeon x20    VC's for sequencing and to maintain gluteal squeeze    monsterwalks 2 laps blue Around ankles x   3 way hip 20x blue  x   Heel taps 2x10 4\"  x   Pt. Edu     Squat mechanics x   Other:       Specific Instructions for next treatment:   Continue with above and progress CKC scapular stabilization, review HEP given for hips    Treatment Charges: Mins Units   []  Modalities     [x]  Ther Exercise 30 2   [x]  Manual Therapy 5 0   []  Ther Activities     []  Aquatics     [x]  Vasocompression 15 1   []  Other     Total Treatment time 50 3       Assessment: [x] Progressing toward goals. Progressed pt with a adding in monsterwalks, 3 way hip, and heel taps for increased hip and knee strengthening with good tolerance. Pt needing VC's for slow and controlled motion with exercises with poor carryover. No increase in pain with exercises this date. Ended with vaso for decreased soreness. [] No change. STG: (to be met in 9 treatments)  1. ? Pain: Patient to report 0/10 R shoulder pain at rest 4/10 at worst, and 3/10 R knee pain at worst  2. ? ROM:PAtient to demonstrate 160 degrees R shoulder flexion and abduction, -10 degrees R knee DF knee extended  3. ? Strength:  4. ? Function: Patient to demonstrate  5. Patient to be independent with home exercise program as demonstrated by performance with correct form without cues. 6. Demonstrate Knowledge of fall prevention    LTG: (to be met in 18 treatments)  1. Patient to report 1/10 R shoulder pain with UE elevation and 1/10 R knee pain with stairs and squatting  2. Patient to demonstrate full R shoulder ROM 0 degrees R knee DF  3. Patient to demonstrate 4/5 mid and lower trap and rhomboid mm  4. Patient to demonstrate 4/5 gluteal medius and max  5. Patient to demonstrate the ability to control knee positioning with SL squats                    Patient goals: To stop the pain      Pt.  Education:  [x] Yes  [] No  [x] Reviewed Prior HEP/Ed  Method of Education: [x]

## 2020-11-20 ENCOUNTER — HOSPITAL ENCOUNTER (OUTPATIENT)
Dept: PHYSICAL THERAPY | Facility: CLINIC | Age: 45
Setting detail: THERAPIES SERIES
Discharge: HOME | End: 2020-11-20
Payer: COMMERCIAL

## 2020-11-20 PROCEDURE — 97016 VASOPNEUMATIC DEVICE THERAPY: CPT

## 2020-11-20 PROCEDURE — 97110 THERAPEUTIC EXERCISES: CPT

## 2020-11-20 NOTE — FLOWSHEET NOTE
[x] [address]   Outpatient Rehabilitation &  Therapy  [address]  P: [phone]  F: [phone]     Physical Therapy Daily Treatment Note    Date:  2020  Patient Name:  Linda Mendez    :  1975  MRN: [id number]  Gregorio Morton MD                       Insurance: [facility]  Medical Diagnosis: Z56.705 (ICD-10-CM) - Pain in right shoulder                          Rehab Codes: M25.511, M25.561  Onset Date: 3/1/20                 Next 's appt: PRN awaiting MRI  Visit# / total visits:      Cancels/No Shows: 0    Subjective:  Pt arrives noting increased R shoulder soreness d/t getting his MRI yesterday. R hip and knee are a little sore as well.    Pain:  [] Yes  [x] No Location: R shoulder   Pain Rating: (0-10 scale) 0/10   Pain altered Tx:  [x] No  [] Yes  Action:  Comments:      Objective:  Modalities: vasocompression [id number] 34 degrees- PRN  Manual: STR R gastroc with and without hypervolt x5mins  Precautions:T10-L5 fusion   Exercises:  Exercise Reps/ Time Weight/ Level Comments    Pully's 5m scaption only scaption VC's for scapular control x           Wall slides  20x   scaption only, 5th finger on wall x   Ball on wall  20x 1.5# s-s, cw   VC's for scapular retraction   90 and 120 degree flexion, 90 degree abduction x          Prone       Prone lower trap on ball 20x10\"  Soccer ball     Prone row w/ flexed elbow 15x5s 5#             Supine serratus press x20 2#     Supine rhythmic stabs 3x30s        Mid back series 20x blue Extension, scaption, triceps    Side lying ER 20x 2# Towel under elbow               4 pt kneel w/ R UE  2x10   VC's for scapular retraction--     4 pt kneel serratus press 20x               Tband 20x blue Extension, triceps, rows           R KNEE       Static gastroc stretch 3x60\"   x   Dynamic gastroc stretch 3x20   x   clamshells 2x15 blue  x   Zahra hip abd 2x15 blue  x   Bridges 20x5\" blue  x   Prone hip ext 20x ea blue  x   modified pigeon x20    VC's for sequencing and to maintain gluteal squeeze    monsterwalks 2 laps blue Around ankles x   3 way hip 20x blue  x   Heel taps 2x10 4\"  x   Pt. Edu     Squat mechanics x   Other:       Specific Instructions for next treatment:   Continue with above and progress CKC scapular stabilization, review HEP given for hips    Treatment Charges: Mins Units   []  Modalities     [x]  Ther Exercise 30 2   [x]  Manual Therapy 5    []  Ther Activities     []  Aquatics     [x]  Vasocompression 15 1   []  Other     Total Treatment time 50 3       Assessment: [x] Progressing toward goals. Held majority of R shoulder ex this date to avoid further irritation, continued with R LE strengthening ex. Added resistance to SL hip abduction and added prone hip extension as well with good pt tolerance. Required increased verbal cues for recall of LE ex added previous visit d/t poor technique demonstrated. Increased soreness post ex. Vaso post ex to R shoulder and R knee for symptom relief. [] No change. STG: (to be met in 9 treatments)  1. ? Pain: Patient to report 0/10 R shoulder pain at rest 4/10 at worst, and 3/10 R knee pain at worst  2. ? ROM:PAtient to demonstrate 160 degrees R shoulder flexion and abduction, -10 degrees R knee DF knee extended  3. ? Strength:  4. ? Function: Patient to demonstrate  5. Patient to be independent with home exercise program as demonstrated by performance with correct form without cues. 6. Demonstrate Knowledge of fall prevention    LTG: (to be met in 18 treatments)  1. Patient to report 1/10 R shoulder pain with UE elevation and 1/10 R knee pain with stairs and squatting  2. Patient to demonstrate full R shoulder ROM 0 degrees R knee DF  3. Patient to demonstrate 4/5 mid and lower trap and rhomboid mm  4. Patient to demonstrate 4/5 gluteal medius and max  5. Patient to demonstrate the ability to control knee positioning with SL squats                    Patient goals:  To stop the pain      Pt. Education:  [x] Yes  [] No  [x] Reviewed Prior HEP/Ed  Method of Education: [x] Verbal  [x] Demo  [] Written  Comprehension of Education:  [x] Verbalizes understanding. [x] Demonstrates understanding. [] Needs review. [] Demonstrates/verbalizes HEP/Ed previously given. Plan: [x] Continue with current plan of care towards goals.         Time In: 8:00 am            Time Out: 8:55 am  Electronically signed by:  Jose Manuel Rausch PTA

## 2020-11-23 ENCOUNTER — HOSPITAL ENCOUNTER (OUTPATIENT)
Dept: PHYSICAL THERAPY | Facility: CLINIC | Age: 45
Setting detail: THERAPIES SERIES
Discharge: HOME | End: 2020-11-23
Payer: COMMERCIAL

## 2020-11-23 PROCEDURE — 97110 THERAPEUTIC EXERCISES: CPT

## 2020-11-23 PROCEDURE — 97016 VASOPNEUMATIC DEVICE THERAPY: CPT

## 2020-11-23 NOTE — FLOWSHEET NOTE
[x] [address]    Outpatient Rehabilitation &  Therapy  [address]  P: [phone]  F: [phone]     Physical Therapy Daily Treatment Note    Date:  2020  Patient Name:  Claudia Juarez    :  1975  MRN: [id number]  Nate Sewell MD                       Insurance: [facility]  Medical Diagnosis: V52.923 (ICD-10-CM) - Pain in right shoulder                          Rehab Codes: M25.511, M25.561  Onset Date: 3/1/20                 Next 's appt: PRN awaiting MRI  Visit# / total visits:      Cancels/No Shows: 0    Subjective:  Pt mentions R shoulder soreness more on the top of the shoulder, soreness has not increased since the previous visit.    Pain:  [] Yes  [x] No Location: R shoulder   Pain Rating: (0-10 scale) 0/10   Pain altered Tx:  [x] No  [] Yes  Action:  Comments:      Objective:  Modalities: vasocompression [id number] 34 degrees- PRN  Manual: STR R gastroc with and without hypervolt x5mins  Precautions:T10-L5 fusion   Exercises:  Exercise Reps/ Time Weight/ Level Comments    Pully's 5m scaption only scaption VC's for scapular control x           Wall slides  20x   scaption only, 5th finger on wall x   Ball on wall  20x 1.5# s-s, cw   VC's for scapular retraction   90 and 120 degree flexion, 90 degree abduction x          Prone       Prone lower trap on ball 20x2  Soccer ball  x   Prone row w/ flexed elbow 15x5s 5#             Supine serratus press x20 2#     Supine rhythmic stabs 3x30s        Mid back series 20x blue Extension, scaption, triceps    Side lying ER 20x2 2# Towel under elbow x              4 pt kneel w/ R/L UE  2x10   VC's for scapular retraction--  x   4 pt kneel serratus press 2x15     x          Tband 20x blue Extension, triceps, rows, ER/IR x          R KNEE       Static gastroc stretch 3x60\"   x   Dynamic gastroc stretch 3x20   x   clamshells 2x15 blue  x   Zahra hip abd 2x15 blue  x   Bridges 30x5\" blue  x   Prone hip ext 20x ea blue  x modified pigeon x20    VC's for sequencing and to maintain gluteal squeeze           monsterwalks 2 laps blue Around ankles x   3 way hip 20x blue  x   Heel taps 2x10 4\"  x   Pt. Edu     Squat mechanics x   Other:       Specific Instructions for next treatment:   Continue with above and progress CKC scapular stabilization, review HEP given for hips    Treatment Charges: Mins Units   []  Modalities     [x]  Ther Exercise 35 2   [x]  Manual Therapy     []  Ther Activities     []  Aquatics     [x]  Vasocompression 15 1   []  Other     Total Treatment time 50 3       Assessment: [x] Progressing toward goals. Resumed more shoulder and scapular strengthening ex to progress stability with good tolerance. Verbal cues with therex to complete in a slow and controlled manner, fair follow through. Demonstrating continued tendency to over-use UT with scapular ex but improves with cueing. RLE soreness with gym ex this date. Vaso applied post ex to control symptoms. [] No change. STG: (to be met in 9 treatments)  1. ? Pain: Patient to report 0/10 R shoulder pain at rest 4/10 at worst, and 3/10 R knee pain at worst  2. ? ROM:PAtient to demonstrate 160 degrees R shoulder flexion and abduction, -10 degrees R knee DF knee extended  3. ? Strength:  4. ? Function: Patient to demonstrate  5. Patient to be independent with home exercise program as demonstrated by performance with correct form without cues. 6. Demonstrate Knowledge of fall prevention    LTG: (to be met in 18 treatments)  1. Patient to report 1/10 R shoulder pain with UE elevation and 1/10 R knee pain with stairs and squatting  2. Patient to demonstrate full R shoulder ROM 0 degrees R knee DF  3. Patient to demonstrate 4/5 mid and lower trap and rhomboid mm  4. Patient to demonstrate 4/5 gluteal medius and max  5. Patient to demonstrate the ability to control knee positioning with SL squats                    Patient goals: To stop the pain      Pt.  Education: [x] Yes  [] No  [x] Reviewed Prior HEP/Ed  Method of Education: [x] Verbal  [x] Demo  [] Written  Comprehension of Education:  [x] Verbalizes understanding. [x] Demonstrates understanding. [] Needs review. [] Demonstrates/verbalizes HEP/Ed previously given. Plan: [x] Continue with current plan of care towards goals.         Time In: 7:04 am            Time Out: 7:56 am  Electronically signed by:  Simone La PTA

## 2020-11-24 ENCOUNTER — HOSPITAL ENCOUNTER (OUTPATIENT)
Dept: PHYSICAL THERAPY | Facility: CLINIC | Age: 45
Setting detail: THERAPIES SERIES
Discharge: HOME | End: 2020-11-24
Payer: COMMERCIAL

## 2020-11-24 PROCEDURE — 97110 THERAPEUTIC EXERCISES: CPT

## 2020-11-24 PROCEDURE — 97016 VASOPNEUMATIC DEVICE THERAPY: CPT

## 2020-11-24 NOTE — FLOWSHEET NOTE
[x] [address]   Outpatient Rehabilitation &  Therapy  [address]  P: [phone]  F: [phone]     Physical Therapy Daily Treatment Note    Date:  2020  Patient Name:  Jes Monte    :  1975  MRN: [id number]  Hiram Montiel MD                       Insurance: [facility]  Medical Diagnosis: A03.323 (ICD-10-CM) - Pain in right shoulder                          Rehab Codes: M25.511, M25.561  Onset Date: 3/1/20                 Next 's appt: PRN awaiting MRI  Visit# / total visits: 10/18     Cancels/No Shows: 0    Subjective:  Pt having no pain or soreness this date. States he felt good after last visit.    Pain:  [] Yes  [x] No Location: R shoulder   Pain Rating: (0-10 scale) 0/10   Pain altered Tx:  [x] No  [] Yes  Action:  Comments:      Objective:  Modalities: vasocompression [id number] 34 degrees- PRN  Manual: STR R gastroc with and without hypervolt x5mins  Precautions:T10-L5 fusion   Exercises:  Exercise Reps/ Time Weight/ Level Comments    bike 8'  Set 5 x   Pully's 5m scaption only scaption VC's for scapular control            Wall slides  20x   scaption only, 5th finger on wall x   Ball on wall  20x 1.5# s-s, cw   VC's for scapular retraction   90 and 120 degree flexion, 90 degree abduction x          Prone       Prone lower trap on ball 20x2  Soccer ball  x   Prone row w/ flexed elbow 15x5s 5#             Supine serratus press x20 2#     Supine rhythmic stabs 3x30s        Mid back series 20x blue Extension, scaption, triceps    Side lying ER 20x2 2# Towel under elbow x              4 pt kneel w/ R/L UE  2x10   VC's for scapular retraction--  x   4 pt kneel serratus press 2x15     x          Tband 20x blue Extension, triceps, rows, ER/IR x          R KNEE       Static gastroc stretch 3x60\"   x   Dynamic gastroc stretch 3x20   x   clamshells 2x15 blue  x   Zahra hip abd 2x15 blue  x   SL Bridges 30x5\"   x   Prone hip ext 30x ea blue  x   modified pigeon x20    VC's for sequencing and to maintain gluteal squeeze           monsterwalks 2 laps pllum Around ankles x   3 way hip 20x plum  x   Heel taps 2x10 4\"  x   SL TG squats 3x10 L16  x   powerstrides 20x 8\"  x   Pt. Edu     Squat mechanics x   Other:       Specific Instructions for next treatment:   Continue with above and progress CKC scapular stabilization, review HEP given for hips    Treatment Charges: Mins Units   []  Modalities     [x]  Ther Exercise 35 2   [x]  Manual Therapy     []  Ther Activities     []  Aquatics     [x]  Vasocompression 15 1   []  Other     Total Treatment time 50 3       Assessment: [x] Progressing toward goals. Continued with exercises and stretches with focus on R shoulder and R LE strengthening with good tolerance. Pt needing VC's for slow and controlled motion with exercises with fair carryover after cues. Pt noting no pain with exercises this date, but noting good muscle fatigue. Ended with vaso to R shoulder and R knee for decreased soreness. [] No change. STG: (to be met in 9 treatments)  1. ? Pain: Patient to report 0/10 R shoulder pain at rest 4/10 at worst, and 3/10 R knee pain at worst  2. ? ROM:PAtient to demonstrate 160 degrees R shoulder flexion and abduction, -10 degrees R knee DF knee extended  3. ? Strength:  4. ? Function: Patient to demonstrate  5. Patient to be independent with home exercise program as demonstrated by performance with correct form without cues. 6. Demonstrate Knowledge of fall prevention    LTG: (to be met in 18 treatments)  1. Patient to report 1/10 R shoulder pain with UE elevation and 1/10 R knee pain with stairs and squatting  2. Patient to demonstrate full R shoulder ROM 0 degrees R knee DF  3. Patient to demonstrate 4/5 mid and lower trap and rhomboid mm  4. Patient to demonstrate 4/5 gluteal medius and max  5. Patient to demonstrate the ability to control knee positioning with SL squats                    Patient goals:  To stop the pain      Pt. Education:  [x] Yes  [] No  [x] Reviewed Prior HEP/Ed  Method of Education: [x] Verbal  [x] Demo  [] Written  Comprehension of Education:  [x] Verbalizes understanding. [x] Demonstrates understanding. [] Needs review. [] Demonstrates/verbalizes HEP/Ed previously given. Plan: [x] Continue with current plan of care towards goals.         Time In: 8:00 am            Time Out: 9:00 am  Electronically signed by:  ePrla Pierce PTA

## 2020-12-01 ENCOUNTER — HOSPITAL ENCOUNTER (OUTPATIENT)
Dept: PHYSICAL THERAPY | Facility: CLINIC | Age: 45
Setting detail: THERAPIES SERIES
Discharge: HOME | End: 2020-12-01
Payer: COMMERCIAL

## 2020-12-01 NOTE — FLOWSHEET NOTE
[] [address]  [address]    [phone]  F: [phone]   [] [address]  [facility] [address]   [address]  P: [phone]  F: [phone]  [x] [address] [facility]  The[facility]  [address] [facility]  P: [phone]  F: [phone] [] [address]  P: [phone]  F: [phone]  [] [address]  [address] [facility] [address]   [address]   [place]: [phone]  F: [phone]   [] [facility]  [address] [facility] [address]  [place]: [phone]   F: [phone]     Physical Therapy Cancel/No Show note    Date: 2020  Patient: Don Chaidez  : 1975  MRN: [id number]    Cancels/No Shows to date:     For today's appointment patient:    []  Cancelled    [] Rescheduled appointment    [x] No-show     Reason given by patient:    []  Patient ill    []  Conflicting appointment    [] No transportation      [] Conflict with work    [] No reason given    [] Weather related    [] [id number]    [x] Other:      Comments:        [] Next appointment was confirmed    Electronically signed by: Rose Germain PTA

## 2020-12-03 ENCOUNTER — HOSPITAL ENCOUNTER (OUTPATIENT)
Dept: PHYSICAL THERAPY | Facility: CLINIC | Age: 45
Setting detail: THERAPIES SERIES
Discharge: HOME | End: 2020-12-03
Payer: COMMERCIAL

## 2020-12-03 PROCEDURE — 97110 THERAPEUTIC EXERCISES: CPT

## 2020-12-03 PROCEDURE — 97016 VASOPNEUMATIC DEVICE THERAPY: CPT

## 2020-12-03 NOTE — FLOWSHEET NOTE
modified pigeon x20    VC's for sequencing and to maintain gluteal squeeze           monsterwalks 2 laps Blue tube At feet x   3 way hip 20x plum  x   Heel taps 2x10 4\"  x   SL TG squats 3x10 L18  x   powerstrides 20x 8\"  -   Pt. Edu     Squat mechanics    Other:       Specific Instructions for next treatment:   Continue with above and progress CKC scapular stabilization, review HEP given for hips    Treatment Charges: Mins Units   []  Modalities     [x]  Ther Exercise 35 2   []  Manual Therapy     []  Ther Activities     []  Aquatics     [x]  Vasocompression 15 1   []  Other     Total Treatment time 50 3       Assessment: [x] Progressing toward goals. Able to increase resistance across program today with good tolerance. Needs frequent cues throughout session to have control with movements and not rush through reps. Some \"clicking\" in the shoulder with tband IR/ER but tolerable and pt able to complete all reps. Issued plum tband and blue tube for HEP. Pt fatigued post session, states pain level a little better. Cont to end with vaso for sx relief. [] No change. STG: (to be met in 9 treatments)  1. ? Pain: Patient to report 0/10 R shoulder pain at rest 4/10 at worst, and 3/10 R knee pain at worst  2. ? ROM:PAtient to demonstrate 160 degrees R shoulder flexion and abduction, -10 degrees R knee DF knee extended  3. ? Strength:  4. ? Function: Patient to demonstrate  5. Patient to be independent with home exercise program as demonstrated by performance with correct form without cues. 6. Demonstrate Knowledge of fall prevention    LTG: (to be met in 18 treatments)  1. Patient to report 1/10 R shoulder pain with UE elevation and 1/10 R knee pain with stairs and squatting  2. Patient to demonstrate full R shoulder ROM 0 degrees R knee DF  3. Patient to demonstrate 4/5 mid and lower trap and rhomboid mm  4. Patient to demonstrate 4/5 gluteal medius and max  5.  Patient to demonstrate the ability to control knee positioning with SL squats                    Patient goals: To stop the pain      Pt. Education:  [x] Yes  [] No  [x] Reviewed Prior HEP/Ed  Method of Education: [x] Verbal  [x] Demo  [] Written  Comprehension of Education:  [x] Verbalizes understanding. [x] Demonstrates understanding. [] Needs review. [] Demonstrates/verbalizes HEP/Ed previously given. Plan: [x] Continue with current plan of care towards goals.          Time In: 8:00 am            Time Out: 9:00 am  Electronically signed by:  Rupert Love PTA

## 2020-12-08 ENCOUNTER — HOSPITAL ENCOUNTER (OUTPATIENT)
Dept: PHYSICAL THERAPY | Facility: CLINIC | Age: 45
Setting detail: THERAPIES SERIES
Discharge: HOME | End: 2020-12-08
Payer: COMMERCIAL

## 2020-12-10 ENCOUNTER — HOSPITAL ENCOUNTER (OUTPATIENT)
Dept: PHYSICAL THERAPY | Facility: CLINIC | Age: 45
Setting detail: THERAPIES SERIES
Discharge: HOME | End: 2020-12-10
Payer: COMMERCIAL

## 2020-12-10 PROCEDURE — 97016 VASOPNEUMATIC DEVICE THERAPY: CPT

## 2020-12-10 PROCEDURE — 97110 THERAPEUTIC EXERCISES: CPT

## 2020-12-10 NOTE — FLOWSHEET NOTE
[x] [address] [facility]   Outpatient Rehabilitation &  Therapy  [address] [place] [address]  P: [phone]  F: [phone]     Physical Therapy Daily Treatment Note    Date:  12/10/2020  Patient Name:  Alana Kaur    :  1975  MRN: [id number]  Sandi Howard MD                       Insurance: [facility]  Medical Diagnosis: R60.215 (ICD-10-CM) - Pain in right shoulder                          Rehab Codes: M25.511, M25.561  Onset Date: 3/1/20                 Next 's appt: PRN awaiting MRI  Visit# / total visits:      Cancels/No Shows: 0    Subjective:    Pain:  [x] Yes  [] No Location: R shoulder   Pain Rating: (0-10 scale) 2/10 R knee, 2/10 R shoulder   Pain altered Tx:  [x] No  [] Yes  Action:  Comments:  Pt states he went and saw his doctor who stated he has a bicep tendon tear, SLAP tear, and labral tear. Surgery scheduled for .     Objective:  Modalities: vasocompression [id number] 34 degrees- PRN  Manual: STR R gastroc with and without hypervolt x5mins - not today  Precautions:T10-L5 fusion   Exercises:  Exercise Reps/ Time Weight/ Level Comments    bike 8'  Set 5 x   Pully's 5m scaption only scaption VC's for scapular control            Wall slides  20x   scaption only, 5th finger on wall -   Ball on wall  20x 3.5# s-s, cw   VC's for scapular retraction   90 and 120 degree flexion, 90 degree abduction x          Prone       Prone lower trap on ball 20x2  Soccer ball  -   Prone row w/ flexed elbow 15x5s 5#             Supine serratus press x20 2#     Supine rhythmic stabs 3x30s        Mid back series 20x blue Extension, scaption, triceps    Side lying ER 20x2 2# Towel under elbow -              4 pt kneel w/ R/L UE  2x10   VC's for scapular retraction--  x   4 pt kneel serratus press 2x15     x   Tband 20x plum Extension, triceps, rows, ER/IR - (held) x   BOSU rocks 20x   x          R KNEE       Static gastroc stretch 3x60\"   x   Dynamic gastroc stretch 3x20   x   clamshells 2x15 plum  x   Zahra hip abd 2x15 plum  x   SL Bridges 30x5\"   x   Prone hip ext 30x ea plum  x   modified pigeon x20    VC's for sequencing and to maintain gluteal squeeze           monsterwalks 2 laps Blue tube At feet x   3 way hip 20x plum  x   Heel taps 2x10 4\"  x   SL TG squats 3x10 L20 Leg press next x   powerstrides 20x 8\"  -   Pt. Edu     Squat mechanics    BOSU lunges x15   x   Other:       Specific Instructions for next treatment:   Continue with above and progress CKC scapular stabilization, review HEP given for hips    Treatment Charges: Mins Units   []  Modalities     [x]  Ther Exercise 35 2   []  Manual Therapy     []  Ther Activities     []  Aquatics     [x]  Vasocompression 15 1   []  Other     Total Treatment time 50 3       Assessment: [x] Progressing toward goals. Added BOSU lunges to work on LE strength and stability. Added BOSU rocks to work on [employer] scapular strength. Held tband IR/ER d/t increased soreness reported at last tx. Able to complete all ex without increasing pain levels. Does reports some increased knee \"tenderness. \" Needs cues for slow and controlled movements as pt tends to rush through exercises. Ended with vaso for sx relief. [] No change. STG: (to be met in 9 treatments)  1. ? Pain: Patient to report 0/10 R shoulder pain at rest 4/10 at worst, and 3/10 R knee pain at worst  2. ? ROM:PAtient to demonstrate 160 degrees R shoulder flexion and abduction, -10 degrees R knee DF knee extended  3. ? Strength:  4. ? Function: Patient to demonstrate  5. Patient to be independent with home exercise program as demonstrated by performance with correct form without cues. 6. Demonstrate Knowledge of fall prevention    LTG: (to be met in 18 treatments)  1. Patient to report 1/10 R shoulder pain with UE elevation and 1/10 R knee pain with stairs and squatting  2. Patient to demonstrate full R shoulder ROM 0 degrees R knee DF  3.  Patient to demonstrate 4/5 mid and lower trap and rhomboid

## 2020-12-15 ENCOUNTER — HOSPITAL ENCOUNTER (OUTPATIENT)
Dept: PHYSICAL THERAPY | Facility: CLINIC | Age: 45
Setting detail: THERAPIES SERIES
End: 2020-12-15
Payer: COMMERCIAL

## 2020-12-29 ENCOUNTER — HOSPITAL ENCOUNTER (OUTPATIENT)
Dept: PHYSICAL THERAPY | Facility: CLINIC | Age: 45
Setting detail: THERAPIES SERIES
Discharge: HOME | End: 2020-12-29
Payer: COMMERCIAL

## 2020-12-29 PROCEDURE — 97110 THERAPEUTIC EXERCISES: CPT

## 2020-12-29 NOTE — FLOWSHEET NOTE
4/5 gluteal medius and max  5. Patient to demonstrate the ability to control knee positioning with SL squats                    Patient goals: To stop the pain      Pt. Education:  [x] Yes  [] No  [] Reviewed Prior HEP/Ed  Method of Education: [x] Verbal  [x] Demo  [] Written  Comprehension of Education:  [x] Verbalizes understanding. [x] Demonstrates understanding. [] Needs review. [] Demonstrates/verbalizes HEP/Ed previously given.      Plan: [x] Discharge at this time as patient will be having surgery Thursday         Time In: 8:00 am            Time Out: 850 am  Electronically signed by:  Jennie Roth, PT

## 2023-08-17 NOTE — ED NOTES
Pt to exam room 7 without difficulty with c/o mid sternal chest tightness and a feeling of being bloated. Pt reports intermittent chest tightness and cough with deep inspiration since Friday. Pt flew from [address] [place] [address] on 8/12/2020. He denies CAD, pain radiation, or diaphoresis.    Pt placed on cardiac monitor and continuous pulse ox     Bahman Benjamin RN  08/23/20 [facility] Pedro Urbina RN  08/23/20 1021
no known allergies